# Patient Record
Sex: MALE | Race: WHITE
[De-identification: names, ages, dates, MRNs, and addresses within clinical notes are randomized per-mention and may not be internally consistent; named-entity substitution may affect disease eponyms.]

---

## 2019-12-05 ENCOUNTER — HOSPITAL ENCOUNTER (OUTPATIENT)
Dept: HOSPITAL 95 - LAB SHORT | Age: 44
End: 2019-12-05
Attending: NURSE PRACTITIONER
Payer: COMMERCIAL

## 2019-12-05 ENCOUNTER — HOSPITAL ENCOUNTER (INPATIENT)
Dept: HOSPITAL 95 - ER | Age: 44
LOS: 3 days | Discharge: HOME | DRG: 871 | End: 2019-12-08
Attending: HOSPITALIST | Admitting: HOSPITALIST
Payer: COMMERCIAL

## 2019-12-05 VITALS — HEIGHT: 78 IN | BODY MASS INDEX: 16.55 KG/M2 | WEIGHT: 143.08 LBS

## 2019-12-05 DIAGNOSIS — I10: ICD-10-CM

## 2019-12-05 DIAGNOSIS — A41.9: Primary | ICD-10-CM

## 2019-12-05 DIAGNOSIS — E05.90: ICD-10-CM

## 2019-12-05 DIAGNOSIS — J44.0: ICD-10-CM

## 2019-12-05 DIAGNOSIS — R64: ICD-10-CM

## 2019-12-05 DIAGNOSIS — Q87.40: ICD-10-CM

## 2019-12-05 DIAGNOSIS — D64.9: ICD-10-CM

## 2019-12-05 DIAGNOSIS — E44.0: ICD-10-CM

## 2019-12-05 DIAGNOSIS — R53.83: Primary | ICD-10-CM

## 2019-12-05 DIAGNOSIS — F17.210: ICD-10-CM

## 2019-12-05 DIAGNOSIS — J13: ICD-10-CM

## 2019-12-05 LAB
ALBUMIN SERPL BCP-MCNC: 3.5 G/DL (ref 3.4–5)
ALBUMIN/GLOB SERPL: 0.9 {RATIO} (ref 0.8–1.8)
ALT SERPL W P-5'-P-CCNC: 20 U/L (ref 12–78)
ANION GAP SERPL CALCULATED.4IONS-SCNC: 6 MMOL/L (ref 6–16)
AST SERPL W P-5'-P-CCNC: 9 U/L (ref 12–37)
BASOPHILS # BLD AUTO: 0.05 K/MM3 (ref 0–0.23)
BASOPHILS NFR BLD AUTO: 0 % (ref 0–2)
BILIRUB SERPL-MCNC: 1.4 MG/DL (ref 0.1–1)
BUN SERPL-MCNC: 18 MG/DL (ref 8–24)
CALCIUM SERPL-MCNC: 8.8 MG/DL (ref 8.5–10.1)
CHLORIDE SERPL-SCNC: 104 MMOL/L (ref 98–108)
CO2 SERPL-SCNC: 25 MMOL/L (ref 21–32)
CREAT SERPL-MCNC: 0.96 MG/DL (ref 0.6–1.2)
DEPRECATED RDW RBC AUTO: 47 FL (ref 35.1–46.3)
EOSINOPHIL # BLD AUTO: 0.01 K/MM3 (ref 0–0.68)
EOSINOPHIL NFR BLD AUTO: 0 % (ref 0–6)
ERYTHROCYTE [DISTWIDTH] IN BLOOD BY AUTOMATED COUNT: 13.2 % (ref 11.7–14.2)
GLOBULIN SER CALC-MCNC: 3.8 G/DL (ref 2.2–4)
GLUCOSE SERPL-MCNC: 109 MG/DL (ref 70–99)
HCT VFR BLD AUTO: 40.6 % (ref 37–53)
HGB BLD-MCNC: 13.5 G/DL (ref 13.5–17.5)
IMM GRANULOCYTES # BLD AUTO: 0.29 K/MM3 (ref 0–0.1)
IMM GRANULOCYTES NFR BLD AUTO: 2 % (ref 0–1)
LYMPHOCYTES # BLD AUTO: 0.85 K/MM3 (ref 0.84–5.2)
LYMPHOCYTES NFR BLD AUTO: 4 % (ref 21–46)
MCHC RBC AUTO-ENTMCNC: 33.3 G/DL (ref 31.5–36.5)
MCV RBC AUTO: 98 FL (ref 80–100)
MONOCYTES # BLD AUTO: 0.7 K/MM3 (ref 0.16–1.47)
MONOCYTES NFR BLD AUTO: 4 % (ref 4–13)
NEUTROPHILS # BLD AUTO: 17.78 K/MM3 (ref 1.96–9.15)
NEUTROPHILS NFR BLD AUTO: 90 % (ref 41–73)
NRBC # BLD AUTO: 0 K/MM3 (ref 0–0.02)
NRBC BLD AUTO-RTO: 0 /100 WBC (ref 0–0.2)
PLATELET # BLD AUTO: 289 K/MM3 (ref 150–400)
POTASSIUM SERPL-SCNC: 3.6 MMOL/L (ref 3.5–5.5)
PROT SERPL-MCNC: 7.3 G/DL (ref 6.4–8.2)
SODIUM SERPL-SCNC: 135 MMOL/L (ref 136–145)

## 2019-12-06 LAB
ALBUMIN SERPL BCP-MCNC: 2.8 G/DL (ref 3.4–5)
ALBUMIN/GLOB SERPL: 0.8 {RATIO} (ref 0.8–1.8)
ALT SERPL W P-5'-P-CCNC: 14 U/L (ref 12–78)
ANION GAP SERPL CALCULATED.4IONS-SCNC: 8 MMOL/L (ref 6–16)
AST SERPL W P-5'-P-CCNC: 10 U/L (ref 12–37)
BILIRUB SERPL-MCNC: 0.9 MG/DL (ref 0.1–1)
BUN SERPL-MCNC: 19 MG/DL (ref 8–24)
CALCIUM SERPL-MCNC: 8.4 MG/DL (ref 8.5–10.1)
CHLORIDE SERPL-SCNC: 103 MMOL/L (ref 98–108)
CO2 SERPL-SCNC: 26 MMOL/L (ref 21–32)
CREAT SERPL-MCNC: 0.96 MG/DL (ref 0.6–1.2)
DEPRECATED RDW RBC AUTO: 47.8 FL (ref 35.1–46.3)
ERYTHROCYTE [DISTWIDTH] IN BLOOD BY AUTOMATED COUNT: 13.3 % (ref 11.7–14.2)
GLOBULIN SER CALC-MCNC: 3.6 G/DL (ref 2.2–4)
GLUCOSE SERPL-MCNC: 112 MG/DL (ref 70–99)
HCT VFR BLD AUTO: 35.8 % (ref 37–53)
HGB BLD-MCNC: 11.8 G/DL (ref 13.5–17.5)
MCHC RBC AUTO-ENTMCNC: 33 G/DL (ref 31.5–36.5)
MCV RBC AUTO: 98 FL (ref 80–100)
NRBC # BLD AUTO: 0 K/MM3 (ref 0–0.02)
NRBC BLD AUTO-RTO: 0 /100 WBC (ref 0–0.2)
PLATELET # BLD AUTO: 225 K/MM3 (ref 150–400)
POTASSIUM SERPL-SCNC: 3.4 MMOL/L (ref 3.5–5.5)
PROT SERPL-MCNC: 6.4 G/DL (ref 6.4–8.2)
SODIUM SERPL-SCNC: 137 MMOL/L (ref 136–145)
TSH SERPL DL<=0.005 MIU/L-ACNC: 0.19 UIU/ML (ref 0.36–4.8)

## 2019-12-07 LAB
ALBUMIN SERPL BCP-MCNC: 2.6 G/DL (ref 3.4–5)
ALBUMIN/GLOB SERPL: 0.7 {RATIO} (ref 0.8–1.8)
ALT SERPL W P-5'-P-CCNC: 19 U/L (ref 12–78)
ANION GAP SERPL CALCULATED.4IONS-SCNC: 5 MMOL/L (ref 6–16)
AST SERPL W P-5'-P-CCNC: 10 U/L (ref 12–37)
BASOPHILS # BLD AUTO: 0.02 K/MM3 (ref 0–0.23)
BASOPHILS NFR BLD AUTO: 0 % (ref 0–2)
BILIRUB SERPL-MCNC: 0.4 MG/DL (ref 0.1–1)
BUN SERPL-MCNC: 20 MG/DL (ref 8–24)
CALCIUM SERPL-MCNC: 8.5 MG/DL (ref 8.5–10.1)
CHLORIDE SERPL-SCNC: 109 MMOL/L (ref 98–108)
CO2 SERPL-SCNC: 27 MMOL/L (ref 21–32)
CREAT SERPL-MCNC: 0.83 MG/DL (ref 0.6–1.2)
DEPRECATED RDW RBC AUTO: 45.8 FL (ref 35.1–46.3)
EOSINOPHIL # BLD AUTO: 0 K/MM3 (ref 0–0.68)
EOSINOPHIL NFR BLD AUTO: 0 % (ref 0–6)
ERYTHROCYTE [DISTWIDTH] IN BLOOD BY AUTOMATED COUNT: 12.9 % (ref 11.7–14.2)
GLOBULIN SER CALC-MCNC: 3.8 G/DL (ref 2.2–4)
GLUCOSE SERPL-MCNC: 136 MG/DL (ref 70–99)
HCT VFR BLD AUTO: 36.7 % (ref 37–53)
HGB BLD-MCNC: 11.9 G/DL (ref 13.5–17.5)
IMM GRANULOCYTES # BLD AUTO: 0.08 K/MM3 (ref 0–0.1)
IMM GRANULOCYTES NFR BLD AUTO: 1 % (ref 0–1)
LYMPHOCYTES # BLD AUTO: 0.61 K/MM3 (ref 0.84–5.2)
LYMPHOCYTES NFR BLD AUTO: 5 % (ref 21–46)
MCHC RBC AUTO-ENTMCNC: 32.4 G/DL (ref 31.5–36.5)
MCV RBC AUTO: 98 FL (ref 80–100)
MONOCYTES # BLD AUTO: 0.26 K/MM3 (ref 0.16–1.47)
MONOCYTES NFR BLD AUTO: 2 % (ref 4–13)
NEUTROPHILS # BLD AUTO: 11.32 K/MM3 (ref 1.96–9.15)
NEUTROPHILS NFR BLD AUTO: 92 % (ref 41–73)
NRBC # BLD AUTO: 0 K/MM3 (ref 0–0.02)
NRBC BLD AUTO-RTO: 0 /100 WBC (ref 0–0.2)
PLATELET # BLD AUTO: 255 K/MM3 (ref 150–400)
POTASSIUM SERPL-SCNC: 3.8 MMOL/L (ref 3.5–5.5)
PROT SERPL-MCNC: 6.4 G/DL (ref 6.4–8.2)
SODIUM SERPL-SCNC: 141 MMOL/L (ref 136–145)

## 2019-12-07 NOTE — NUR
Shift Summary
 
Patient appeared to sleep well overnight. He has not been able to produce a
sputum sample yet. Crackles present to left lower lung on auscultation. Cough
and deep breathing encouraged. Fluid encouraged.

## 2019-12-08 LAB
BASOPHILS # BLD AUTO: 0.01 K/MM3 (ref 0–0.23)
BASOPHILS NFR BLD AUTO: 0 % (ref 0–2)
DEPRECATED RDW RBC AUTO: 47.1 FL (ref 35.1–46.3)
EOSINOPHIL # BLD AUTO: 0 K/MM3 (ref 0–0.68)
EOSINOPHIL NFR BLD AUTO: 0 % (ref 0–6)
ERYTHROCYTE [DISTWIDTH] IN BLOOD BY AUTOMATED COUNT: 12.9 % (ref 11.7–14.2)
HCT VFR BLD AUTO: 35.5 % (ref 37–53)
HGB BLD-MCNC: 11.7 G/DL (ref 13.5–17.5)
IMM GRANULOCYTES # BLD AUTO: 0.16 K/MM3 (ref 0–0.1)
IMM GRANULOCYTES NFR BLD AUTO: 1 % (ref 0–1)
LYMPHOCYTES # BLD AUTO: 0.68 K/MM3 (ref 0.84–5.2)
LYMPHOCYTES NFR BLD AUTO: 5 % (ref 21–46)
MCHC RBC AUTO-ENTMCNC: 33 G/DL (ref 31.5–36.5)
MCV RBC AUTO: 99 FL (ref 80–100)
MONOCYTES # BLD AUTO: 0.33 K/MM3 (ref 0.16–1.47)
MONOCYTES NFR BLD AUTO: 3 % (ref 4–13)
NEUTROPHILS # BLD AUTO: 12.07 K/MM3 (ref 1.96–9.15)
NEUTROPHILS NFR BLD AUTO: 91 % (ref 41–73)
NRBC # BLD AUTO: 0 K/MM3 (ref 0–0.02)
NRBC BLD AUTO-RTO: 0 /100 WBC (ref 0–0.2)
PLATELET # BLD AUTO: 299 K/MM3 (ref 150–400)

## 2019-12-08 NOTE — NUR
PT DISCHARGED FROM UNIT. PT AMBULATED. DAUGHTER WILL MEET HIM DOWNSTAIRS TO
PROVIDE A RIDE HOME. DISCHARGE INSTRUCTIONS REVIEWED. IV REMOVED. FAXED
MEDICATIONS TO [facility]. DISCUSSED FOLLOW UP APPOINTMENT NEEDED.

## 2020-03-11 ENCOUNTER — HOSPITAL ENCOUNTER (OUTPATIENT)
Dept: HOSPITAL 95 - LAB | Age: 45
Discharge: HOME | End: 2020-03-11
Attending: INTERNAL MEDICINE
Payer: COMMERCIAL

## 2020-03-11 DIAGNOSIS — M65.849: Primary | ICD-10-CM

## 2020-03-11 LAB
ALBUMIN SERPL BCP-MCNC: 3.4 G/DL (ref 3.4–5)
ALBUMIN/GLOB SERPL: 0.9 {RATIO} (ref 0.8–1.8)
ALT SERPL W P-5'-P-CCNC: 36 U/L (ref 12–78)
ANION GAP SERPL CALCULATED.4IONS-SCNC: 4 MMOL/L (ref 6–16)
AST SERPL W P-5'-P-CCNC: 17 U/L (ref 12–37)
BILIRUB SERPL-MCNC: 0.4 MG/DL (ref 0.1–1)
BUN SERPL-MCNC: 18 MG/DL (ref 8–24)
CALCIUM SERPL-MCNC: 8.9 MG/DL (ref 8.5–10.1)
CHLORIDE SERPL-SCNC: 106 MMOL/L (ref 98–108)
CO2 SERPL-SCNC: 26 MMOL/L (ref 21–32)
CREAT SERPL-MCNC: 0.97 MG/DL (ref 0.6–1.2)
CRP SERPL-MCNC: 0.93 MG/DL (ref 0–0.3)
DEPRECATED RDW RBC AUTO: 45.3 FL (ref 35.1–46.3)
ERYTHROCYTE [DISTWIDTH] IN BLOOD BY AUTOMATED COUNT: 12.4 % (ref 11.7–14.2)
GLOBULIN SER CALC-MCNC: 3.8 G/DL (ref 2.2–4)
GLUCOSE SERPL-MCNC: 87 MG/DL (ref 70–99)
HCT VFR BLD AUTO: 39.2 % (ref 37–53)
HGB BLD-MCNC: 12.6 G/DL (ref 13.5–17.5)
MCHC RBC AUTO-ENTMCNC: 32.1 G/DL (ref 31.5–36.5)
MCV RBC AUTO: 99 FL (ref 80–100)
NRBC # BLD AUTO: 0 K/MM3 (ref 0–0.02)
NRBC BLD AUTO-RTO: 0 /100 WBC (ref 0–0.2)
PLATELET # BLD AUTO: 336 K/MM3 (ref 150–400)
POTASSIUM SERPL-SCNC: 4.2 MMOL/L (ref 3.5–5.5)
PROT SERPL-MCNC: 7.2 G/DL (ref 6.4–8.2)
SODIUM SERPL-SCNC: 136 MMOL/L (ref 136–145)

## 2021-08-13 ENCOUNTER — HOSPITAL ENCOUNTER (INPATIENT)
Dept: HOSPITAL 95 - ER | Age: 46
LOS: 3 days | Discharge: HOME | DRG: 177 | End: 2021-08-16
Attending: HOSPITALIST | Admitting: HOSPITALIST
Payer: COMMERCIAL

## 2021-08-13 VITALS — HEIGHT: 77 IN | WEIGHT: 155.21 LBS | BODY MASS INDEX: 18.33 KG/M2

## 2021-08-13 DIAGNOSIS — M54.5: ICD-10-CM

## 2021-08-13 DIAGNOSIS — Z98.890: ICD-10-CM

## 2021-08-13 DIAGNOSIS — R91.8: ICD-10-CM

## 2021-08-13 DIAGNOSIS — G89.29: ICD-10-CM

## 2021-08-13 DIAGNOSIS — J44.0: ICD-10-CM

## 2021-08-13 DIAGNOSIS — J12.82: ICD-10-CM

## 2021-08-13 DIAGNOSIS — E87.2: ICD-10-CM

## 2021-08-13 DIAGNOSIS — I10: ICD-10-CM

## 2021-08-13 DIAGNOSIS — E89.0: ICD-10-CM

## 2021-08-13 DIAGNOSIS — I73.00: ICD-10-CM

## 2021-08-13 DIAGNOSIS — J85.0: Primary | ICD-10-CM

## 2021-08-13 DIAGNOSIS — Z79.899: ICD-10-CM

## 2021-08-13 DIAGNOSIS — Z66: ICD-10-CM

## 2021-08-13 DIAGNOSIS — U07.1: ICD-10-CM

## 2021-08-13 DIAGNOSIS — F17.210: ICD-10-CM

## 2021-08-13 DIAGNOSIS — R63.4: ICD-10-CM

## 2021-08-13 LAB
ALBUMIN SERPL BCP-MCNC: 1.6 G/DL (ref 3.4–5)
ALBUMIN/GLOB SERPL: 0.4 {RATIO} (ref 0.8–1.8)
ALT SERPL W P-5'-P-CCNC: 37 U/L (ref 12–78)
ANION GAP SERPL CALCULATED.4IONS-SCNC: 4 MMOL/L (ref 6–16)
AST SERPL W P-5'-P-CCNC: 31 U/L (ref 12–37)
BASOPHILS # BLD: 0.38 K/MM3 (ref 0–0.23)
BASOPHILS NFR BLD: 1 % (ref 0–2)
BILIRUB SERPL-MCNC: 0.4 MG/DL (ref 0.1–1)
BUN SERPL-MCNC: 20 MG/DL (ref 8–24)
CALCIUM SERPL-MCNC: 7.7 MG/DL (ref 8.5–10.1)
CHLORIDE SERPL-SCNC: 104 MMOL/L (ref 98–108)
CO2 SERPL-SCNC: 28 MMOL/L (ref 21–32)
CREAT SERPL-MCNC: 0.97 MG/DL (ref 0.6–1.2)
DEPRECATED RDW RBC AUTO: 42.9 FL (ref 35.1–46.3)
EOSINOPHIL # BLD: 5.37 K/MM3 (ref 0–0.68)
EOSINOPHIL NFR BLD: 14 % (ref 0–6)
ERYTHROCYTE [DISTWIDTH] IN BLOOD BY AUTOMATED COUNT: 13 % (ref 11.7–14.2)
GLOBULIN SER CALC-MCNC: 4.1 G/DL (ref 2.2–4)
GLUCOSE SERPL-MCNC: 84 MG/DL (ref 70–99)
HCT VFR BLD AUTO: 32.4 % (ref 37–53)
HGB BLD-MCNC: 10.9 G/DL (ref 13.5–17.5)
LYMPHOCYTES # BLD: 1.53 K/MM3 (ref 0.84–5.2)
LYMPHOCYTES NFR BLD: 4 % (ref 21–46)
MCHC RBC AUTO-ENTMCNC: 33.6 G/DL (ref 31.5–36.5)
MCV RBC AUTO: 90 FL (ref 80–100)
METAMYELOCYTES # BLD MANUAL: 0.38 K/MM3 (ref 0–0)
METAMYELOCYTES NFR BLD MANUAL: 1 % (ref 0–0)
MONOCYTES # BLD: 0 K/MM3 (ref 0.16–1.47)
MONOCYTES NFR BLD: 0 % (ref 4–13)
MYELOCYTES # BLD MANUAL: 0.38 K/MM3 (ref 0–0)
MYELOCYTES NFR BLD MANUAL: 1 % (ref 0–0)
NEUTS BAND NFR BLD MANUAL: 9 % (ref 0–8)
NEUTS SEG # BLD MANUAL: 30.33 K/MM3 (ref 1.96–9.15)
NEUTS SEG NFR BLD MANUAL: 70 % (ref 41–73)
NRBC # BLD AUTO: 0 K/MM3 (ref 0–0.02)
NRBC BLD AUTO-RTO: 0 /100 WBC (ref 0–0.2)
PLATELET # BLD AUTO: 454 K/MM3 (ref 150–400)
POTASSIUM SERPL-SCNC: 3.9 MMOL/L (ref 3.5–5.5)
PROT SERPL-MCNC: 5.7 G/DL (ref 6.4–8.2)
SODIUM SERPL-SCNC: 136 MMOL/L (ref 136–145)
TOTAL CELLS COUNTED BLD: 100

## 2021-08-13 PROCEDURE — U0004 COV-19 TEST NON-CDC HGH THRU: HCPCS

## 2021-08-13 PROCEDURE — A9270 NON-COVERED ITEM OR SERVICE: HCPCS

## 2021-08-14 LAB
ANION GAP SERPL CALCULATED.4IONS-SCNC: 9 MMOL/L (ref 6–16)
BASOPHILS # BLD: 0 K/MM3 (ref 0–0.23)
BASOPHILS NFR BLD: 0 % (ref 0–2)
BUN SERPL-MCNC: 15 MG/DL (ref 8–24)
CALCIUM SERPL-MCNC: 6.5 MG/DL (ref 8.5–10.1)
CHLORIDE SERPL-SCNC: 106 MMOL/L (ref 98–108)
CO2 SERPL-SCNC: 23 MMOL/L (ref 21–32)
CREAT SERPL-MCNC: 0.78 MG/DL (ref 0.6–1.2)
DEPRECATED RDW RBC AUTO: 42.3 FL (ref 35.1–46.3)
EOSINOPHIL # BLD: 8.68 K/MM3 (ref 0–0.68)
EOSINOPHIL NFR BLD: 24 % (ref 0–6)
ERYTHROCYTE [DISTWIDTH] IN BLOOD BY AUTOMATED COUNT: 13.1 % (ref 11.7–14.2)
GLUCOSE SERPL-MCNC: 100 MG/DL (ref 70–99)
HCT VFR BLD AUTO: 30.4 % (ref 37–53)
HGB BLD-MCNC: 10.4 G/DL (ref 13.5–17.5)
LYMPHOCYTES # BLD: 1.44 K/MM3 (ref 0.84–5.2)
LYMPHOCYTES NFR BLD: 4 % (ref 21–46)
MCHC RBC AUTO-ENTMCNC: 34.2 G/DL (ref 31.5–36.5)
MCV RBC AUTO: 88 FL (ref 80–100)
MONOCYTES # BLD: 0.36 K/MM3 (ref 0.16–1.47)
MONOCYTES NFR BLD: 1 % (ref 4–13)
MYELOCYTES # BLD MANUAL: 1.44 K/MM3 (ref 0–0)
MYELOCYTES NFR BLD MANUAL: 4 % (ref 0–0)
NEUTS BAND NFR BLD MANUAL: 7 % (ref 0–8)
NEUTS SEG # BLD MANUAL: 24.24 K/MM3 (ref 1.96–9.15)
NEUTS SEG NFR BLD MANUAL: 60 % (ref 41–73)
NRBC # BLD AUTO: 0 K/MM3 (ref 0–0.02)
NRBC BLD AUTO-RTO: 0 /100 WBC (ref 0–0.2)
PLATELET # BLD AUTO: 431 K/MM3 (ref 150–400)
POTASSIUM SERPL-SCNC: 3.5 MMOL/L (ref 3.5–5.5)
SARS-COV-2 RNA RESP QL NAA+PROBE: POSITIVE
SODIUM SERPL-SCNC: 138 MMOL/L (ref 136–145)
TOTAL CELLS COUNTED BLD: 100

## 2021-08-14 PROCEDURE — 3E0D73Z INTRODUCTION OF ANTI-INFLAMMATORY INTO MOUTH AND PHARYNX, VIA NATURAL OR ARTIFICIAL OPENING: ICD-10-PCS | Performed by: STUDENT IN AN ORGANIZED HEALTH CARE EDUCATION/TRAINING PROGRAM

## 2021-08-14 PROCEDURE — XW033E5 INTRODUCTION OF REMDESIVIR ANTI-INFECTIVE INTO PERIPHERAL VEIN, PERCUTANEOUS APPROACH, NEW TECHNOLOGY GROUP 5: ICD-10-PCS | Performed by: STUDENT IN AN ORGANIZED HEALTH CARE EDUCATION/TRAINING PROGRAM

## 2021-08-14 NOTE — NUR
PT TRANSFERRED TO . REPORT GIVEN TO KERON FAN. PT COVID TEST CAME BACK
POSITIVE PT INITIATED ON ISOLATION PROTOCOL. PT REMAINED ON RA. REFUSED ANY
PAIN MEDICINE. OTHER VITALS REMAINS STABLE. NO ISSUES REPORTED THIS MORNING,
ABLE TO MAKE NEEDS KNOWN, PT TRANSFERRED VIA WHEELCHAIR ALL BELONGINGS SENT
WITH THE PT, ACCOMPANIED BY PCT UPON TRANSFER

## 2021-08-14 NOTE — NUR
PT TRANSFERRED TO UNIT ON BED, COVID+ CONTACT CART OUTSIDE ROOM, PT A/O X 4,
L, PLEASANT/COOPERAIVE, PROVIDED PO LIQUIDS AND WARM BLANKET. PT DENIES ANY
PAIN, DENIES N/V, ROOM TEMPERATURE SET TO PT'S REQUEST

## 2021-08-14 NOTE — NUR
shift summary: VS stable, no acute changes. pt remains SOB with exertions, SPB
>90% on room air. pt rates pain in chest/r shoulder blade at 4/10 following
reassessment of analgesia per MAR. pt tolerated PO intake, sitting up in side
of bed, voiding. remains in COVID droplet precautions. pt remains a/o x 4,
pleasant/cooperative.

## 2021-08-14 NOTE — NUR
pt sitting up in bed, RA with SPO2 >90%. pt reports chest/R shoulder blade
pain. analagesia provided per MAR. pt denies n/v, tolerating PO but states he
is not really hungry.
lungs: clear R lobes, diminished/crackles in the L lobes

## 2021-08-15 LAB
BASOPHILS # BLD: 0 K/MM3 (ref 0–0.23)
BASOPHILS NFR BLD: 0 % (ref 0–2)
DEPRECATED RDW RBC AUTO: 42.5 FL (ref 35.1–46.3)
EOSINOPHIL # BLD: 0.66 K/MM3 (ref 0–0.68)
EOSINOPHIL NFR BLD: 3 % (ref 0–6)
ERYTHROCYTE [DISTWIDTH] IN BLOOD BY AUTOMATED COUNT: 13.1 % (ref 11.7–14.2)
HCT VFR BLD AUTO: 32.6 % (ref 37–53)
HGB BLD-MCNC: 10.9 G/DL (ref 13.5–17.5)
LYMPHOCYTES # BLD: 1.1 K/MM3 (ref 0.84–5.2)
LYMPHOCYTES NFR BLD: 5 % (ref 21–46)
MCHC RBC AUTO-ENTMCNC: 33.4 G/DL (ref 31.5–36.5)
MCV RBC AUTO: 88 FL (ref 80–100)
METAMYELOCYTES # BLD MANUAL: 0.22 K/MM3 (ref 0–0)
METAMYELOCYTES NFR BLD MANUAL: 1 % (ref 0–0)
MONOCYTES # BLD: 0.22 K/MM3 (ref 0.16–1.47)
MONOCYTES NFR BLD: 1 % (ref 4–13)
MYELOCYTES # BLD MANUAL: 0.44 K/MM3 (ref 0–0)
MYELOCYTES NFR BLD MANUAL: 2 % (ref 0–0)
NEUTS BAND NFR BLD MANUAL: 21 % (ref 0–8)
NEUTS SEG # BLD MANUAL: 19.38 K/MM3 (ref 1.96–9.15)
NEUTS SEG NFR BLD MANUAL: 67 % (ref 41–73)
NRBC # BLD AUTO: 0 K/MM3 (ref 0–0.02)
NRBC BLD AUTO-RTO: 0 /100 WBC (ref 0–0.2)
PLATELET # BLD AUTO: 415 K/MM3 (ref 150–400)
TOTAL CELLS COUNTED BLD: 100

## 2021-08-15 NOTE — NUR
SHIFT SUMMARY:
BILL IS A&OX4. VSS, NO ACUTE EVENTS OVERNIGHT, MAINTAINING SATS ORA. HE IS
INDEPENDENT IN THE ROOM, TOLERATING PO INTAKE WELL BUT REPORTS A POOR
APPETITE. IV TO R AC PATENT, NO DIFFICULTIES URINATING. HE IS LYING IN BED
WITH THE CALL LIGHT IN REACH. WILL REPORT TO DAY SHIFT RN.

## 2021-08-15 NOTE — NUR
SHIFT SUMMARY
PT A&OX4, VSS, INDEPENDENT IN ROOM/TO BRP, VOIDING, HELEN PO/LOW PO INTAKE, PAIN
TREATED WITH 5 MG NORCO. PLAN FOR POSS INTERVENTIONAL BIOPSY TOMORROW, DR KERN
HE WOULD PUT IN ORDER FOR NPO IF IT WAS GOING FORWARD ON MONDAY. REPORT
PROVIDED TO ONCOMING NOC RN.

## 2021-08-15 NOTE — NUR
RECEIVED REPORT AND ASSUMED CARE OF PT. HE IS SITTING UP IN BED, A&OX4,
MAINTAINING SATS ORA. HE REPORTS THAT HE WAS SENT TO THE ER BY DR. KAYE FOR
EVALUATION OF THE MASS ON HIS NECK WHICH SHE WANTED BIOPSIED. HE DENIES ANY
NEW SYMPTOMS AND REPORTS HE HAS BEEN AT HOME, ALONE FOR THE MOST PART, FOR THE
PAST MONTH. HE DENIES ANY NEEDS AT THIS TIME. TM.

## 2021-08-16 LAB
ANION GAP SERPL CALCULATED.4IONS-SCNC: 8 MMOL/L (ref 6–16)
BASOPHILS # BLD: 0 K/MM3 (ref 0–0.23)
BASOPHILS NFR BLD: 0 % (ref 0–2)
BUN SERPL-MCNC: 24 MG/DL (ref 8–24)
CALCIUM SERPL-MCNC: 7.4 MG/DL (ref 8.5–10.1)
CHLORIDE SERPL-SCNC: 106 MMOL/L (ref 98–108)
CO2 SERPL-SCNC: 24 MMOL/L (ref 21–32)
CREAT SERPL-MCNC: 0.85 MG/DL (ref 0.6–1.2)
DEPRECATED RDW RBC AUTO: 43.4 FL (ref 35.1–46.3)
EOSINOPHIL # BLD: 0.55 K/MM3 (ref 0–0.68)
EOSINOPHIL NFR BLD: 2 % (ref 0–6)
ERYTHROCYTE [DISTWIDTH] IN BLOOD BY AUTOMATED COUNT: 13.4 % (ref 11.7–14.2)
GLUCOSE SERPL-MCNC: 103 MG/DL (ref 70–99)
HCT VFR BLD AUTO: 34.5 % (ref 37–53)
HGB BLD-MCNC: 11.6 G/DL (ref 13.5–17.5)
LYMPHOCYTES # BLD: 0.83 K/MM3 (ref 0.84–5.2)
LYMPHOCYTES NFR BLD: 3 % (ref 21–46)
MAGNESIUM SERPL-MCNC: 1.9 MG/DL (ref 1.6–2.4)
MCHC RBC AUTO-ENTMCNC: 33.6 G/DL (ref 31.5–36.5)
MCV RBC AUTO: 89 FL (ref 80–100)
MONOCYTES # BLD: 0.55 K/MM3 (ref 0.16–1.47)
MONOCYTES NFR BLD: 2 % (ref 4–13)
MYELOCYTES # BLD MANUAL: 0.83 K/MM3 (ref 0–0)
MYELOCYTES NFR BLD MANUAL: 3 % (ref 0–0)
NEUTS BAND NFR BLD MANUAL: 6 % (ref 0–8)
NEUTS SEG # BLD MANUAL: 24.9 K/MM3 (ref 1.96–9.15)
NEUTS SEG NFR BLD MANUAL: 84 % (ref 41–73)
NRBC # BLD AUTO: 0 K/MM3 (ref 0–0.02)
NRBC BLD AUTO-RTO: 0 /100 WBC (ref 0–0.2)
PHOSPHATE SERPL-MCNC: 2 MG/DL (ref 2.5–4.9)
PLATELET # BLD AUTO: 508 K/MM3 (ref 150–400)
POTASSIUM SERPL-SCNC: 3.8 MMOL/L (ref 3.5–5.5)
SODIUM SERPL-SCNC: 138 MMOL/L (ref 136–145)
TOTAL CELLS COUNTED BLD: 100

## 2021-08-16 NOTE — NUR
SHIFT SUMMARY:
BILL IS A&OX4. VSS, NO ACUTE EVENTS OVERNIGHT. HE IS INDEPENDENT IN THE ROOM,
TOLERATING PO INTAKE WELL, URINATING WITHOUT DIFFICULTY. HE DENIES ANY NEW OR
WORSENING SYMPTOMS. IV TO R AC PATENT. HE IS LYING IN BED WITH THE CALL LIGHT
IN REACH. WILL REPORT TO DAY SHIFT RN.

## 2021-08-16 NOTE — NUR
RECENT DISCHARGE:
PT REPORTS UNDERSTANDING OF DISCHARGE INSTRUCTIONS. PT REPORTS FEELING READY
TO GO HOME. VSS. PT ON RA. PT ABLE TO MOVE ABOUT IN ROOM WITHOUT DIFFICULTY.
PT IV OUT WNL, NO OTHER IV'S IN PLACE. PT OUT TO CAR WITH PRECAUTIONS IN
PLACE, PT REPORTS HAVING N95 MASK AT HOME. REPORTS WIFE GIVING RIDE HOME,
DISCUSSED PRECAUTIONS WITH HER AS WELL INCLUDING MASK.

## 2021-08-16 NOTE — NUR
REPORTS PLANNING ON DISCHARGING PT HOME, REPORTS WILL PLACE ORDERS.
DISCUSSED WITH CHARGE RN TO ASSIST WITH DISCHARGE.

## 2021-08-26 ENCOUNTER — HOSPITAL ENCOUNTER (OUTPATIENT)
Dept: HOSPITAL 95 - US | Age: 46
Discharge: HOME | End: 2021-08-26
Attending: INTERNAL MEDICINE
Payer: COMMERCIAL

## 2021-08-26 DIAGNOSIS — Z87.891: ICD-10-CM

## 2021-08-26 DIAGNOSIS — I10: ICD-10-CM

## 2021-08-26 DIAGNOSIS — R91.8: ICD-10-CM

## 2021-08-26 DIAGNOSIS — C77.0: Primary | ICD-10-CM
